# Patient Record
(demographics unavailable — no encounter records)

---

## 2024-11-20 NOTE — PHYSICAL EXAM
[No Acute Distress] : no acute distress [Cachectic] : cachexia was observed [Normal] : no respiratory distress, lungs were clear to auscultation bilaterally and no accessory muscle use [Normal Rate] : normal rate  [Normal S1, S2] : normal S1 and S2 [No Murmur] : no murmur heard [No Edema] : there was no peripheral edema [Soft] : abdomen soft [Non Tender] : non-tender [Normal Posterior Cervical Nodes] : no posterior cervical lymphadenopathy [Normal Anterior Cervical Nodes] : no anterior cervical lymphadenopathy [No Focal Deficits] : no focal deficits [Alert and Oriented x3] : oriented to person, place, and time [de-identified] : irregular rhythm consistent with history

## 2024-11-20 NOTE — HISTORY OF PRESENT ILLNESS
[Post-hospitalization from ___ Hospital] : Post-hospitalization from [unfilled] Hospital [Admitted on: ___] : The patient was admitted on [unfilled] [Discharged on ___] : discharged on [unfilled] [Discharge Summary] : discharge summary [Pertinent Labs] : pertinent labs [Radiology Findings] : radiology findings [Discharge Med List] : discharge medication list [Other: ____] : [unfilled] [Med Reconciliation] : medication reconciliation has been completed [Patient Contacted By: ____] : and contacted by [unfilled] [FreeTextEntry2] : Presented with abdominal pain - imaging revealed CBD stone and biliary stricture - ERCP performed with stone removal, duct dilatation and stent placement.  Pt feels improved at present; very weak; states drinking "Ensure" for nutrition.

## 2024-11-20 NOTE — PLAN
[FreeTextEntry1] : GI F/U as scheduled Lab profiles drawn in office and sent Discussed the importance of nutrition Plan F/U one month

## 2025-02-21 NOTE — PHYSICAL EXAM
[No Acute Distress] : no acute distress [Normal] : no respiratory distress, lungs were clear to auscultation bilaterally and no accessory muscle use [Normal Rate] : normal rate  [Normal S1, S2] : normal S1 and S2 [No Murmur] : no murmur heard [No Edema] : there was no peripheral edema [Soft] : abdomen soft [Non Tender] : non-tender [Normal Posterior Cervical Nodes] : no posterior cervical lymphadenopathy [Normal Anterior Cervical Nodes] : no anterior cervical lymphadenopathy [No Focal Deficits] : no focal deficits [Alert and Oriented x3] : oriented to person, place, and time [de-identified] : iregular rhythm

## 2025-02-21 NOTE — HISTORY OF PRESENT ILLNESS
[de-identified] : Presents with family for general follow-up.  Reviewed events - had been hospitalized for cholangitis - now has a new stent - to F/U with GI.  Most pressing issue is eating - long discussion as to the importance of nutrition to healing and generally feeling better.

## 2025-03-17 NOTE — PHYSICAL EXAM
[Cachectic] : cachexia was observed [Normal Rate] : normal rate  [Normal S1, S2] : normal S1 and S2 [No Murmur] : no murmur heard [No Edema] : there was no peripheral edema [Normal] : soft, non-tender, non-distended, no masses palpated, no HSM and normal bowel sounds [Normal Posterior Cervical Nodes] : no posterior cervical lymphadenopathy [Normal Anterior Cervical Nodes] : no anterior cervical lymphadenopathy [No Focal Deficits] : no focal deficits [Alert and Oriented x3] : oriented to person, place, and time [de-identified] : irregular rhythm

## 2025-03-17 NOTE — ASSESSMENT
[FreeTextEntry1] : Hemodynamically stable Lab profiles drawn in office and sent Will try low dose Mirtazapine and observe

## 2025-03-17 NOTE — HISTORY OF PRESENT ILLNESS
[de-identified] : Presents with  for general follow-up.  Note small weight gain - again discussed intake.  Pt is S/P ERCP with stent change.

## 2025-04-14 NOTE — REASON FOR VISIT
[Initial Evaluation] : an initial evaluation for [FreeTextEntry2] : burning tongue and dysphagia  Zoryve Counseling:  I discussed with the patient that Zoryve is not for use in the eyes, mouth or vagina. The most commonly reported side effects include diarrhea, headache, insomnia, application site pain, upper respiratory tract infections, and urinary tract infections.  All of the patient's questions and concerns were addressed.

## 2025-04-14 NOTE — ASSESSMENT
[FreeTextEntry1] : 84 year old female presents for evaluation of burning tongue   -No concerning findings on physical and fiberoptic exam, reassured patient  -Discussed etiologies at length including  -Educated on foods to avoid to decrease symptoms and inflammation -Will refer to SLP for swallowing exercise -Omeprazole daily  -Multi vitamin tablet, Fe, and/or vitamin B complex also recommend to decrease symptoms -Follow up if not improving with conservative measures -They are in agreement with this plan

## 2025-04-14 NOTE — HISTORY OF PRESENT ILLNESS
[de-identified] : 84 year old female presents for evaluation of burning tongue. Referred by Dr. Shelton. Started about a month ago. Drinking fluids help. Has tried gargling salt water with no relief. Spouse reports decreased appetite and dysphagia with since hospitalization last year. Had about 35 lb weight loss since last year. Patient states her voice has changed since the fall, she also notes a increase in her saliva, has to spit out secretions at times and at other times her mouth is dry.    Pt denies dysphonia, dysphagia, dyspnea, pain, recent fevers or infections.

## 2025-04-14 NOTE — HISTORY OF PRESENT ILLNESS
[de-identified] : 84 year old female presents for evaluation of burning tongue. Referred by Dr. Shelton. Started about a month ago. Drinking fluids help. Has tried gargling salt water with no relief. Spouse reports decreased appetite and dysphagia with since hospitalization last year. Had about 35 lb weight loss since last year. Patient states her voice has changed since the fall, she also notes a increase in her saliva, has to spit out secretions at times and at other times her mouth is dry.    Pt denies dysphonia, dysphagia, dyspnea, pain, recent fevers or infections.

## 2025-04-22 NOTE — PHYSICAL EXAM
[No Acute Distress] : no acute distress [Normal] : no respiratory distress, lungs were clear to auscultation bilaterally and no accessory muscle use [Normal Rate] : normal rate  [Normal S1, S2] : normal S1 and S2 [No Murmur] : no murmur heard [No Edema] : there was no peripheral edema [Normal Appearance] : normal in appearance [No Masses] : no palpable masses [No Nipple Discharge] : no nipple discharge [Soft] : abdomen soft [Non Tender] : non-tender [Normal Posterior Cervical Nodes] : no posterior cervical lymphadenopathy [Normal Anterior Cervical Nodes] : no anterior cervical lymphadenopathy [No Focal Deficits] : no focal deficits [Alert and Oriented x3] : oriented to person, place, and time [de-identified] : irregular rhythm consistent with history

## 2025-04-22 NOTE — HISTORY OF PRESENT ILLNESS
[de-identified] : Presents with  and son for general follow-up.  Reviewed medication and diet - note small but very desirable weight gain.  Discussed the importance of increased mobility and endurance. Patient also requests a breast exam.

## 2025-04-22 NOTE — ASSESSMENT
[FreeTextEntry1] : Hemodynamically stable with acceptable BP Cardiac status stable with rhythm consistent with history No clinical evidence of breast pathology

## 2025-04-30 NOTE — REASON FOR VISIT
[Initial] : initial visit for [Clinical Swallow] : clinical swallow evaluation [Spouse] : spouse [Family Member] : family member

## 2025-04-30 NOTE — ASSESSMENT
[FreeTextEntry1] : CLINICAL DYSPHAGIA EVALUATION  Date of Evaluation:  2025  Patient Name: Maria Luz Goss  : 1940  Primary Diagnosis:  Dysphagia  Treatment Diagnosis:  Dysphagia  Referring Physician: Dr. Naga Schmidt       REASON FOR REFERRAL:  Maria Luz Goss is an 84-year-old female with complaint of difficulty swallowing and was seen today for a comprehensive Clinical Dysphagia Evaluation at [facility] upon the referral of  their physician, Dr. Schmidt, to rule out aspiration, assess for diet texture change as appropriate, explore positional strategies, and/or compensatory techniques as necessary.  The physician ordered this evaluation to ensure that the patient meets their nutrition/hydration needs by mouth without compromising respiratory status.      HISTORY OF PRESENTING ILLNESS:  Ms. Goss arrived to today's evaluation accompanied by her  and son and was a reliable informant.  The patient was received awake, alert, seated upright in wheelchair, in NAD.  The patient notes that she has no appetite and has little to no interest in eating. She reports that she mainly drinks 3-4 ensures per day and eats minimal amounts of food including  piece of toast with peanut butter/jelly, beef bone broth, and cheese and crackers. She notes feelings of pharyngeal stasis and states that feelings clear with a liquid wash. She further notes a 35lb weight loss, but has recently gained about 5-6lbs. Patient was recently diagnosed with burning tongue syndrome and reports overall changes in taste and a lisp with /s/ sounds.    Of note, patient was recently admitted to [facility] in 2025 and [facility] in . Clinical bedside evaluation completed at [facility] recommended regular solids and thin liquids. Patient received MBS at [facility] with recommendations of regular solids and thin liquids. Of note 2, patient has history of tracheostomy and PEG-tube, now decannulated and PEG removed.    CURRENT NUTRITIONAL INTAKE:   The patient reports diet of regular solids and thin liquids, though limited PO intake   MEDICAL HISTORY, per EMR:  Active Problems  Abnormal CBC (790.6) (R79.89)  Abnormal mammogram (793.80) (R92.8)  Abnormal x-ray (793.99) (R93.89)  Anxiety (300.00) (F41.9)  Arthropathy (716.90) (M12.9)  ASHD (arteriosclerotic heart disease) (414.00) (I25.10)  Asteototic dermatitis (692.89) (L30.8)  Atrophic vaginitis (627.3) (N95.2)  Back pain (724.5) (M54.9)  Bell's palsy (351.0) (G51.0)  Biliary stricture (576.2) (K83.1)  Bronchospasm (519.11) (J98.01)  Cataract, left (366.9) (H26.9)  Chest pain, atypical (786.59) (R07.89)  Choledocholithiasis (574.50) (K80.50)  Counseled about COVID-19 virus infection (V65.49) (Z71.89)  Decreased appetite (783.0) (R63.0)  Dysautonomia (337.9) (G90.1)  Elevated fasting glucose (790.21) (R73.01)  Elevated hemoglobin A1c measurement (790.29) (R73.09)  Fatigue (780.79) (R53.83)  H/O herpes zoster virus (V12.09) (Z86.19)  History of atrial fibrillation (V12.59) (Z86.79)  History of biliary stent insertion (V45.89) (Z98.890)  History of osteoarthritis (V13.4) (Z87.39)  History of polycystic kidney disease (V13.09) (Z87.718)  Human metapneumovirus (hMPV) pneumonia (480.8) (J12.3)  Hyperlipidemia, unspecified hyperlipidemia type (272.4) (E78.5)  Hypertension, unspecified type (401.9) (I10)  Influenza vaccine administered (V04.81) (Z23)  Insect bite of leg (916.4,E906.4) (S80.869A,W57.XXXA)  Left retinal detachment (361.9) (H33.22)  LPRD (laryngopharyngeal reflux disease) (478.79) (K21.9)  Lump of breast (611.72) (N63.0)  Mass of right breast, unspecified quadrant (611.72) (N63.10)  Neck pain (723.1) (M54.2)  Osteoporosis (733.00) (M81.0)  Prediabetes (790.29) (R73.03)  Preop examination (V72.84) (Z01.818)  Pre-op examination (V72.84) (Z01.818)  Proctitis (569.49) (K62.89)  Renal transplant recipient (V42.0) (Z94.0)  Right lower quadrant abdominal pain (789.03) (R10.31)  Screening for breast cancer (V76.10) (Z12.39)  Screening for viral disease (V73.99) (Z11.59)  Seborrheic keratosis (702.19) (L82.1)  Shortness of breath (786.05) (R06.02)  Small bowel obstruction due to adhesions (560.81) (K56.50)  Solar elastosis (692.74) (L57.8)  Traumatic ecchymosis of finger, initial encounter (923.3) (S60.00XA)  Underweight (783.22) (R63.6)  UTI (urinary tract infection) (599.0) (N39.0)  Visit for gynecologic examination (V72.31) (Z01.419)  Vitamin D deficiency (268.9) (E55.9)     Past Medical History  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)  History of Chronic vulvitis (616.10) (N76.3)  History of atrial fibrillation (V12.59) (Z86.79)  History of chronic kidney disease (V13.09) (Z87.448)  History of esophageal reflux (V12.79) (Z87.19)  History of renal failure (V13.09) (Z87.448)  History of senile atrophic vaginitis (V13.29) (Z87.42)  History of Ischemic colitis (557.9) (K55.9)  History of Presence of stent in coronary artery (V45.82) (Z95.5)  History of Right lower quadrant abdominal pain (789.03) (R10.31)  History of Visit for gynecologic examination (V72.31) (Z01.419)   Medications were discussed and reviewed and can be found in the patient's medical chart.  Allergies were discussed and reviewed and can be found in the patient's medical chart.    CLINICAL FINDINGS  Oral Peripheral Assessment: mandible, lips, tongue, teeth, soft palate  Structures: WFLs  Symmetry: WFLs          Dentition: WFLs  Soft Palate: WFLs  Secretions: WFLs  Cognition/Communication: WFLs  Voice: WFLs    Functions:  Assessment of the labio-lingual and mandibular musculature revealed ROM WFLs. Slight lingual tremor noted upon rest.    Volitional Swallow: hyolaryngeal excursion upon digital palpation    Consistencies Administered: puree, soft/bite size, regular solids, and thin liquids   Oral Stage:    The patient demonstrated a mild oral dysphagia characterized by adequate oral grading, adequate spoon stripping, adequate cup retrieval, adequate labial closure, prolonged mastication, delayed anterior-posterior transport, and prolonged bolus manipulation/transfer.    Pharyngeal Stage:  The patient presents with a functional pharyngeal swallow for all administered consistencies characterized by suspected timely swallow trigger, hyolaryngeal excursion upon digital palpation, and no overt signs/symptoms of penetration/aspiration.    IMPRESSIONS:   Patient presents with a mild oral dysphagia and functional pharyngeal stage.   PROGNOSIS:      Good with therapeutic intervention.    RECOMMENDATIONS:   1. The patient was advised to consume a diet consisting of easy to chew solids and thin liquids   2. The patient was advised to alternate bites and sips  3. Follow up with referring physician, as directed   EDUCATION: Verbal educational information and instruction was provided to the patient in regard to what a course of dysphagia therapy entails and HEP.    PLAN OF CARE:  Long Term Goals  1) Patient will tolerate least restrictive diet without difficulty.    Should you have any additional concerns, please contact the Center at [phone]   Shannon Fields MS, CF-SLP

## 2025-05-23 NOTE — PHYSICAL EXAM
[Cachectic] : cachexia was observed [Normal] : no respiratory distress, lungs were clear to auscultation bilaterally and no accessory muscle use [Normal Rate] : normal rate  [Normal S1, S2] : normal S1 and S2 [No Murmur] : no murmur heard [No Edema] : there was no peripheral edema [Soft] : abdomen soft [Non Tender] : non-tender [Normal Posterior Cervical Nodes] : no posterior cervical lymphadenopathy [Normal Anterior Cervical Nodes] : no anterior cervical lymphadenopathy [No Focal Deficits] : no focal deficits [Alert and Oriented x3] : oriented to person, place, and time [de-identified] : irregular rhythm

## 2025-05-23 NOTE — HISTORY OF PRESENT ILLNESS
[de-identified] : Presents with  and son for BP check, labs, and general follow-up.  Again discussed nutrition.  Note - this visit will also serve as medical clearance for planned ERCP.

## 2025-05-23 NOTE — ASSESSMENT
[FreeTextEntry1] : Clinically stable with acceptable BP - pt appears medically stable for planned procedure Lab profiles drawn in office and sent

## 2025-07-17 NOTE — HISTORY OF PRESENT ILLNESS
[de-identified] : Presents with family for BP check, labs, and general follow-up.  Family encouraging eating - note small but desirable weight gain.  Continues in PT.